# Patient Record
Sex: FEMALE | Race: WHITE
[De-identification: names, ages, dates, MRNs, and addresses within clinical notes are randomized per-mention and may not be internally consistent; named-entity substitution may affect disease eponyms.]

---

## 2023-04-24 ENCOUNTER — HOSPITAL ENCOUNTER (OUTPATIENT)
Dept: HOSPITAL 47 - RADCTMAIN | Age: 53
Discharge: HOME | End: 2023-04-24
Attending: OPHTHALMOLOGY
Payer: COMMERCIAL

## 2023-04-24 DIAGNOSIS — G44.219: Primary | ICD-10-CM

## 2023-04-24 PROCEDURE — 70482 CT ORBIT/EAR/FOSSA W/O&W/DYE: CPT

## 2023-04-25 NOTE — CT
EXAMINATION TYPE: CT orbits wo/w con

CT DLP: 890.9 mGycm, Automated exposure control for dose reduction was used.

 

DATE OF EXAM: 4/24/2023 3:29 PM

 

COMPARISON: None.  

 

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
ed headaches and dilated pupils x3 weeks

 

TECHNIQUE:

 

Orbits: Axial CT with coronal and sagittal reformats through the orbits without and with 100 cc of Is
ovue-300 was utilized. IV, no oral contrast was utilized.

 

Findings:

Orbital Contents: 

*  Globes: Normal.

*  Preseptal Tissues: Normal.

*  Intraconal Structures: Normal.

*  Extraconal Structures and Lacrimal Glands: Normal.

*  Orbital [place]: Normal.

 

Sella Turcica and Cavernous Sinuses: The sella turcica and cavernous sinus regions are intact and sym
metric.

 

Visualized Brain Parenchyma: Normal.

 

Paranasal Sinuses and Surrounding Structures: The paranasal sinuses are intact. The mastoid air cells
 and skull base is intact.

 

Other: Soft tissues are within normal limits. No evidence for intracranial aneurysm. The intracranial
 vasculature is patent. No abnormal postcontrast enhancement.

 

IMPRESSION:

No evidence of orbital irregularity or mass. The orbits and globes appear symmetric.

No abnormal postcontrast enhancement.